# Patient Record
Sex: MALE | Race: WHITE | NOT HISPANIC OR LATINO | Employment: OTHER | URBAN - METROPOLITAN AREA
[De-identification: names, ages, dates, MRNs, and addresses within clinical notes are randomized per-mention and may not be internally consistent; named-entity substitution may affect disease eponyms.]

---

## 2019-05-24 ENCOUNTER — HOSPITAL ENCOUNTER (OUTPATIENT)
Dept: RADIOLOGY | Facility: HOSPITAL | Age: 64
Discharge: HOME | End: 2019-05-24
Attending: INTERNAL MEDICINE
Payer: MEDICARE

## 2019-05-24 ENCOUNTER — OFFICE VISIT (OUTPATIENT)
Dept: PULMONOLOGY | Facility: CLINIC | Age: 64
End: 2019-05-24
Payer: MEDICARE

## 2019-05-24 ENCOUNTER — TELEPHONE (OUTPATIENT)
Dept: PULMONOLOGY | Facility: CLINIC | Age: 64
End: 2019-05-24

## 2019-05-24 VITALS
SYSTOLIC BLOOD PRESSURE: 138 MMHG | OXYGEN SATURATION: 92 % | DIASTOLIC BLOOD PRESSURE: 86 MMHG | BODY MASS INDEX: 33.95 KG/M2 | WEIGHT: 184.5 LBS | HEIGHT: 62 IN | RESPIRATION RATE: 18 BRPM | HEART RATE: 88 BPM

## 2019-05-24 DIAGNOSIS — Z90.2 S/P LOBECTOMY OF LUNG: ICD-10-CM

## 2019-05-24 DIAGNOSIS — Z95.5 HISTORY OF CORONARY ARTERY STENT PLACEMENT: ICD-10-CM

## 2019-05-24 DIAGNOSIS — F17.210 TOBACCO DEPENDENCE DUE TO CIGARETTES: ICD-10-CM

## 2019-05-24 DIAGNOSIS — J44.9 CHRONIC OBSTRUCTIVE PULMONARY DISEASE, UNSPECIFIED COPD TYPE: Primary | ICD-10-CM

## 2019-05-24 DIAGNOSIS — R09.02 HYPOXEMIA: ICD-10-CM

## 2019-05-24 DIAGNOSIS — J44.9 CHRONIC OBSTRUCTIVE PULMONARY DISEASE, UNSPECIFIED COPD TYPE: ICD-10-CM

## 2019-05-24 DIAGNOSIS — F10.11 H/O ETOH ABUSE: ICD-10-CM

## 2019-05-24 PROCEDURE — 71046 XR CHEST PA AND LATERAL: ICD-10-PCS | Mod: 26,,, | Performed by: RADIOLOGY

## 2019-05-24 PROCEDURE — 99204 OFFICE O/P NEW MOD 45 MIN: CPT | Mod: S$PBB,,, | Performed by: INTERNAL MEDICINE

## 2019-05-24 PROCEDURE — 99999 PR PBB SHADOW E&M-NEW PATIENT-LVL III: ICD-10-PCS | Mod: PBBFAC,,, | Performed by: INTERNAL MEDICINE

## 2019-05-24 PROCEDURE — 71046 X-RAY EXAM CHEST 2 VIEWS: CPT | Mod: TC

## 2019-05-24 PROCEDURE — 99204 PR OFFICE/OUTPT VISIT, NEW, LEVL IV, 45-59 MIN: ICD-10-PCS | Mod: S$PBB,,, | Performed by: INTERNAL MEDICINE

## 2019-05-24 PROCEDURE — 99203 OFFICE O/P NEW LOW 30 MIN: CPT | Mod: PBBFAC,25 | Performed by: INTERNAL MEDICINE

## 2019-05-24 PROCEDURE — 99999 PR PBB SHADOW E&M-NEW PATIENT-LVL III: CPT | Mod: PBBFAC,,, | Performed by: INTERNAL MEDICINE

## 2019-05-24 PROCEDURE — 71046 X-RAY EXAM CHEST 2 VIEWS: CPT | Mod: 26,,, | Performed by: RADIOLOGY

## 2019-05-24 RX ORDER — DOXEPIN HYDROCHLORIDE 50 MG/1
50 CAPSULE ORAL NIGHTLY
COMMUNITY

## 2019-05-24 RX ORDER — ROPINIROLE 0.25 MG/1
0.25 TABLET, FILM COATED ORAL 3 TIMES DAILY
COMMUNITY

## 2019-05-24 RX ORDER — CLOPIDOGREL BISULFATE 75 MG/1
75 TABLET ORAL DAILY
COMMUNITY

## 2019-05-24 RX ORDER — OMEPRAZOLE 40 MG/1
40 CAPSULE, DELAYED RELEASE ORAL DAILY
COMMUNITY

## 2019-05-24 RX ORDER — ALBUTEROL SULFATE 0.63 MG/3ML
0.63 SOLUTION RESPIRATORY (INHALATION) EVERY 6 HOURS PRN
COMMUNITY
End: 2019-05-24

## 2019-05-24 RX ORDER — METOPROLOL SUCCINATE 25 MG/1
25 TABLET, EXTENDED RELEASE ORAL DAILY
COMMUNITY

## 2019-05-24 RX ORDER — NAPROXEN SODIUM 220 MG/1
81 TABLET, FILM COATED ORAL DAILY
COMMUNITY

## 2019-05-24 RX ORDER — CYCLOBENZAPRINE HCL 10 MG
10 TABLET ORAL 3 TIMES DAILY PRN
COMMUNITY

## 2019-05-24 RX ORDER — BUPROPION HYDROCHLORIDE 75 MG/1
75 TABLET ORAL 2 TIMES DAILY
COMMUNITY

## 2019-05-24 RX ORDER — GABAPENTIN 800 MG/1
800 TABLET ORAL 3 TIMES DAILY
COMMUNITY

## 2019-05-24 RX ORDER — LISINOPRIL 10 MG/1
10 TABLET ORAL DAILY
COMMUNITY

## 2019-05-24 RX ORDER — TAMSULOSIN HYDROCHLORIDE 0.4 MG/1
0.4 CAPSULE ORAL DAILY
COMMUNITY

## 2019-05-24 RX ORDER — ATORVASTATIN CALCIUM 10 MG/1
10 TABLET, FILM COATED ORAL DAILY
COMMUNITY

## 2019-05-24 NOTE — PROGRESS NOTES
Initial Outpatient Pulmonary Evaluation       SUBJECTIVE:     Chief Complaint   Patient presents with    COPD    Cancer       History of Present Illness:    Patient is a 63 y.o. male from [place] who relocated to [place] starting 2015 for rehab program from opioid abuse.  He is currently also on ETOH abuse program.  Has been sober for 6 months.    He has significant respiratory history for right upper lobectomy for a 3 cm as per patient size lung cancer that was detected incidentally upon doing Cardiac scanning after receiving a diagnosis of myocardial infarction and having subsequently a coronary stent placed in 2016.    Forty-five pack year smoking history.  At some point quit for 10 years.  Now smoking half pack per day after his surgery of right upper lobectomy January 2019.  Failed smoking cessation with Chantix and nicotine patches due to adverse effects.    Did not receive radiation or chemotherapy.    Use talk as .    Has chronic cough rarely using short-acting bronchodilator.    He states in 2015 had a sleep study that showed mild abnormalities he was not offered CPAP therapy.  Review of Systems   Constitutional: Negative for fever and chills.   HENT: Negative for nosebleeds.    Eyes: Negative for redness.   Respiratory: Positive for cough, shortness of breath and use of rescue inhaler. Negative for choking.    Cardiovascular: Negative for chest pain.        Status post cardiac stent   Genitourinary: Negative for hematuria.   Endocrine: Negative for cold intolerance.    Musculoskeletal: Positive for arthralgias.   Skin: Negative for rash.   Gastrointestinal: Negative for vomiting.   Neurological: Negative for syncope.   Hematological: Negative for adenopathy.        History of lung cancer status post right upper lobectomy.   Psychiatric/Behavioral: Positive for sleep disturbance. Negative for confusion. The patient is not nervous/anxious.          Restless leg       Review of patient's allergies indicates:  No Known Allergies    Current Outpatient Medications   Medication Sig Dispense Refill    albuterol sulfate (VENTOLIN INHL) Inhale 2 puffs into the lungs.      aspirin 81 MG Chew Take 81 mg by mouth once daily.      atorvastatin (LIPITOR) 10 MG tablet Take 10 mg by mouth once daily.      buPROPion (WELLBUTRIN) 75 MG tablet Take 75 mg by mouth 2 (two) times daily.      clopidogrel (PLAVIX) 75 mg tablet Take 75 mg by mouth once daily.      cyclobenzaprine (FLEXERIL) 10 MG tablet Take 10 mg by mouth 3 (three) times daily as needed for Muscle spasms.      doxepin (SINEQUAN) 50 MG capsule Take 50 mg by mouth every evening.      gabapentin (NEURONTIN) 800 MG tablet Take 800 mg by mouth 3 (three) times daily.      lisinopril 10 MG tablet Take 10 mg by mouth once daily.      metoprolol succinate (TOPROL-XL) 25 MG 24 hr tablet Take 25 mg by mouth once daily.      omeprazole (PRILOSEC) 40 MG capsule Take 40 mg by mouth once daily.      rOPINIRole (REQUIP) 0.25 MG tablet Take 0.25 mg by mouth 3 (three) times daily.      tamsulosin (FLOMAX) 0.4 mg Cap Take 0.4 mg by mouth once daily.       No current facility-administered medications for this visit.        Past Medical History:   Diagnosis Date    CAD (coronary artery disease)     sp stent 2016    Hypertension     Lung cancer      Past Surgical History:   Procedure Laterality Date    HERNIA REPAIR      LUNG LOBECTOMY      REPAIR OF MENISCUS OF KNEE       History reviewed. No pertinent family history.  Social History     Tobacco Use    Smoking status: Current Some Day Smoker     Packs/day: 0.50     Types: Cigarettes    Smokeless tobacco: Never Used    Tobacco comment: 45 PY   Substance Use Topics    Alcohol use: Not Currently     Comment: in ETOH program    Drug use: Not Currently     Comment: opiates          OBJECTIVE:     Vital Signs (Most Recent)  Vital Signs  Pulse: 88  Resp: 18  SpO2: (!)  "92 %  BP: 138/86  Height and Weight  Height: 5' 2" (157.5 cm)  Weight: 83.7 kg (184 lb 8.4 oz)  BSA (Calculated - sq m): 1.91 sq meters  BMI (Calculated): 33.8  Weight in (lb) to have BMI = 25: 136.4]  Wt Readings from Last 2 Encounters:   05/24/19 83.7 kg (184 lb 8.4 oz)         Physical Exam:  Physical Exam   Constitutional: He is oriented to person, place, and time. He appears well-developed. He is obese.   HENT:   Head: Normocephalic.   Neck: Neck supple.   Cardiovascular: Normal rate, regular rhythm and normal heart sounds.   Pulmonary/Chest: Normal expansion and effort normal. No stridor. No respiratory distress. He has decreased breath sounds. He has no wheezes. He has no rhonchi. He has no rales. He exhibits no tenderness.   Abdominal: Soft.   Musculoskeletal: He exhibits no tenderness.   Lymphadenopathy:     He has no cervical adenopathy.   Neurological: He is alert and oriented to person, place, and time. Gait normal.   Skin: Skin is warm. No cyanosis. Nails show no clubbing.   Psychiatric: He has a normal mood and affect. His behavior is normal. Judgment and thought content normal.   Nursing note and vitals reviewed.      Laboratory  No results found for: WBC, RBC, HGB, HCT, MCV, MCH, MCHC, RDW, PLT, MPV, GRAN, LYMPH, MONO, EOS, BASO, EOSINOPHIL, BASOPHIL    BMP  No results found for: NA, K, CL, CO2, BUN, CREATININE, CALCIUM, ANIONGAP, ESTGFRAFRICA, EGFRNONAA, AST, ALT, PROT    No results found for: BNP    No results found for: TSH    No results found for: SEDRATE    No results found for: CRP      Diagnostic Results:  I have personally reviewed today the following studies :    Studies will be faxed to us from [facility].  Staff will obtain release form signed nature today.    ASSESSMENT/PLAN:     Chronic obstructive pulmonary disease, unspecified COPD type  -     X-Ray Chest PA And Lateral; Future; Expected date: 05/24/2019  -     Complete PFT without bronchodilator; Future    S/P lobectomy of " lung  -     X-Ray Chest PA And Lateral; Future; Expected date: 05/24/2019  -     Complete PFT without bronchodilator; Future    Tobacco dependence due to cigarettes  -     Complete PFT without bronchodilator; Future    Hypoxemia  -     Stress test, pulmonary; Future    History of coronary artery stent placement    H/O ETOH abuse      Continue albuterol p.r.n.    Chest chest x-ray and PFT.    Obtain outside records.    Check 6 min walking test.  Sat 92% on room air.    Continue aspirin statin Plavix and beta-blocker.    Follow-up with ETOH rehab.    Further recommendation to follow above workup.          Follow up in about 2 months (around 7/24/2019).    This note was prepared using voice recognition system and is likely to have sound alike errors that may have been overlooked even after proof reading.  Please call me with any questions    Discussed diagnosis, its evaluation, treatment and usual course. All questions answered.    Thank you for the courtesy of participating in the care of this patient    Haseeb Duncan MD

## 2019-05-24 NOTE — LETTER
May 24, 2019      Jeyson Sierra, [facility]  [address]  [place] [address]           [facility]  [address]  [place] [address]  Phone: [phone]  Fax: [phone]          Patient: Geoffrey Zurita   MR Number: [id number]   YOB: 1955   Date of Visit: 5/24/2019       Dear Jeyson Sierra:    Thank you for referring Geoffrey Zurita to me for evaluation. Attached you will find relevant portions of my assessment and plan of care.    If you have questions, please do not hesitate to call me. I look forward to following Geoffrey Zurita along with you.    Sincerely,    Haseeb Duncan MD    Enclosure  CC:  No Recipients    If you would like to receive this communication electronically, please contact [email or web address] or [phone] to request more information on [employer] access.    For providers and/or their staff who would like to refer a patient to [facility], please contact us through our one-stop-shop provider referral line, [facility], at [phone].    If you feel you have received this communication in error or would no longer like to receive these types of communications, please e-mail [email or web address]

## 2019-06-20 ENCOUNTER — CLINICAL SUPPORT (OUTPATIENT)
Dept: PULMONOLOGY | Facility: CLINIC | Age: 64
End: 2019-06-20
Payer: MEDICARE

## 2019-06-20 VITALS — BODY MASS INDEX: 32.5 KG/M2 | HEIGHT: 63 IN | WEIGHT: 183.44 LBS

## 2019-06-20 DIAGNOSIS — F17.210 TOBACCO DEPENDENCE DUE TO CIGARETTES: ICD-10-CM

## 2019-06-20 DIAGNOSIS — R09.02 HYPOXEMIA: ICD-10-CM

## 2019-06-20 DIAGNOSIS — Z90.2 S/P LOBECTOMY OF LUNG: ICD-10-CM

## 2019-06-20 DIAGNOSIS — J44.9 CHRONIC OBSTRUCTIVE PULMONARY DISEASE, UNSPECIFIED COPD TYPE: ICD-10-CM

## 2019-06-20 LAB
BRPFT: ABNORMAL
DLCO ADJ PRE: 13.44 ML/(MIN*MMHG) (ref 16.12–29.98)
DLCO SINGLE BREATH LLN: 16.12
DLCO SINGLE BREATH PRE REF: 58.3 %
DLCO SINGLE BREATH REF: 23.05
DLCOC SBVA LLN: 2.57
DLCOC SBVA PRE REF: 95.3 %
DLCOC SBVA REF: 4.04
DLCOC SINGLE BREATH LLN: 16.12
DLCOC SINGLE BREATH PRE REF: 58.3 %
DLCOC SINGLE BREATH REF: 23.05
DLCOVA LLN: 2.57
DLCOVA PRE REF: 95.3 %
DLCOVA PRE: 3.85 ML/(MIN*MMHG*L) (ref 2.57–5.51)
DLCOVA REF: 4.04
DLVAADJ PRE: 3.85 ML/(MIN*MMHG*L) (ref 2.57–5.51)
ERV LLN: 1
ERV PRE REF: 74.4 %
ERV REF: 1
FEF 25 75 LLN: 1.16
FEF 25 75 PRE REF: 34.3 %
FEF 25 75 REF: 2.41
FEV1 FVC LLN: 66
FEV1 FVC PRE REF: 81.1 %
FEV1 FVC REF: 78
FEV1 LLN: 2.08
FEV1 PRE REF: 77.1 %
FEV1 REF: 2.78
FRCPLETH LLN: 2.22
FRCPLETH PREREF: 82.5 %
FRCPLETH REF: 3.2
FVC LLN: 2.69
FVC PRE REF: 95.1 %
FVC REF: 3.54
IVC PRE: 2.42 L (ref 2.69–4.39)
IVC SINGLE BREATH LLN: 2.69
IVC SINGLE BREATH PRE REF: 68.5 %
IVC SINGLE BREATH REF: 3.54
MVV LLN: 89
MVV PRE REF: 63 %
MVV REF: 105
PEF LLN: 5.77
PEF PRE REF: 100.2 %
PEF REF: 7.65
PRE DLCO: 13.44 ML/(MIN*MMHG) (ref 16.12–29.98)
PRE ERV: 0.74 L (ref 0.99–0.99)
PRE FEF 25 75: 0.83 L/S (ref 1.16–3.66)
PRE FET 100: 12.86 SEC
PRE FEV1 FVC: 63.67 % (ref 66.04–90.96)
PRE FEV1: 2.15 L (ref 2.08–3.48)
PRE FRC PL: 2.64 L
PRE FVC: 3.37 L (ref 2.69–4.39)
PRE MVV: 66 L/MIN (ref 89.11–120.57)
PRE PEF: 7.66 L/S (ref 5.77–9.52)
PRE RV: 1.74 L (ref 1.53–2.88)
PRE TLC: 5.11 L (ref 4.55–6.86)
RAW LLN: 3.06
RAW PRE REF: 152.9 %
RAW PRE: 4.68 CMH2O*S/L (ref 3.06–3.06)
RAW REF: 3.06
RV LLN: 1.53
RV PRE REF: 78.9 %
RV REF: 2.21
RVTLC LLN: 29
RVTLC PRE REF: 90.3 %
RVTLC PRE: 34.08 % (ref 28.77–46.73)
RVTLC REF: 38
TLC LLN: 4.55
TLC PRE REF: 89.6 %
TLC REF: 5.7
VA PRE: 3.5 L (ref 5.55–5.55)
VA SINGLE BREATH LLN: 5.55
VA SINGLE BREATH PRE REF: 63 %
VA SINGLE BREATH REF: 5.55
VC LLN: 2.69
VC PRE REF: 95.1 %
VC PRE: 3.37 L (ref 2.69–4.39)
VC REF: 3.54
VTGRAWPRE: 3.3 L

## 2019-06-20 PROCEDURE — 94375 RESPIRATORY FLOW VOLUME LOOP: CPT | Mod: 26,59,S$PBB, | Performed by: INTERNAL MEDICINE

## 2019-06-20 PROCEDURE — 94729 DIFFUSING CAPACITY: CPT | Mod: 26,S$PBB,, | Performed by: INTERNAL MEDICINE

## 2019-06-20 PROCEDURE — 94726 PULM FUNCT TST PLETHYSMOGRAP: ICD-10-PCS | Mod: 26,S$PBB,, | Performed by: INTERNAL MEDICINE

## 2019-06-20 PROCEDURE — 94375 RESPIRATORY FLOW VOLUME LOOP: CPT | Mod: PBBFAC

## 2019-06-20 PROCEDURE — 94729 PR C02/MEMBANE DIFFUSE CAPACITY: ICD-10-PCS | Mod: 26,S$PBB,, | Performed by: INTERNAL MEDICINE

## 2019-06-20 PROCEDURE — 94618 PULMONARY STRESS TESTING: CPT | Mod: PBBFAC

## 2019-06-20 PROCEDURE — 94618 PULMONARY STRESS TESTING: ICD-10-PCS | Mod: 26,S$PBB,, | Performed by: INTERNAL MEDICINE

## 2019-06-20 PROCEDURE — 94726 PLETHYSMOGRAPHY LUNG VOLUMES: CPT | Mod: PBBFAC

## 2019-06-20 PROCEDURE — 94726 PLETHYSMOGRAPHY LUNG VOLUMES: CPT | Mod: 26,S$PBB,, | Performed by: INTERNAL MEDICINE

## 2019-06-20 PROCEDURE — 94618 PULMONARY STRESS TESTING: CPT | Mod: 26,S$PBB,, | Performed by: INTERNAL MEDICINE

## 2019-06-20 PROCEDURE — 94729 DIFFUSING CAPACITY: CPT | Mod: PBBFAC

## 2019-06-20 PROCEDURE — 94010 BREATHING CAPACITY TEST: CPT | Mod: PBBFAC

## 2019-06-20 PROCEDURE — 94375 PR RESPIRATORY FLOW VOLUME LOOP: ICD-10-PCS | Mod: 26,59,S$PBB, | Performed by: INTERNAL MEDICINE

## 2019-06-20 NOTE — PROCEDURES
"[facility]  Six Minute Walk     SUMMARY     Ordering Provider: Dr Duncan   Interpreting Provider: Dr Duncan  Performing nurse/tech/RT: JIAN Ramirez RRT  Diagnosis: (hypoxemia)  Height: 5' 3" (160 cm)  Weight: 83.2 kg (183 lb 6.8 oz)  BMI (Calculated): 32.6   Patient Race:             Phase Oxygen Assessment Supplemental O2 Heart   Rate Blood Pressure Faisal Dyspnea Scale Rating   Resting 97 % Room Air 60 bpm 150/90 0   Exercise        Minute        1 96 % Room Air 91 bpm     2 94 % Room Air 90 bpm     3 94 % Room Air 91 bpm     4 95 % Room Air 93 bpm     5 95 % Room Air 91 bpm     6  95 % Room Air 88 bpm (!) 152/95 1   Recovery        Minute        1 97 % Room Air 63 bpm     2 97 % Room Air 62 bpm     3 98 % Room Air 60 bpm     4 97 % Room Air 63 bpm (!) 142/99 1     Six Minute Walk Summary     Patient Reported: No complaints     Interpretation:  Did the patient stop or pause?: No                                         Total Time Walked (Calculated): 360 seconds  Final Partial Lap Distance (feet): 0 feet  Total Distance Meters (Calculated): 365.76 meters  Predicted Distance Meters (Calculated): 449.55 meters  Percentage of Predicted (Calculated): 81.36  Peak VO2 (Calculated): 14.95  Mets: 4.27  Has The Patient Had a Previous Six Minute Walk Test?: No       Previous 6MWT Results  Has The Patient Had a Previous Six Minute Walk Test?: No  "